# Patient Record
Sex: FEMALE | Race: WHITE | NOT HISPANIC OR LATINO | Employment: UNEMPLOYED | RURAL
[De-identification: names, ages, dates, MRNs, and addresses within clinical notes are randomized per-mention and may not be internally consistent; named-entity substitution may affect disease eponyms.]

---

## 2022-06-20 ENCOUNTER — OFFICE VISIT (OUTPATIENT)
Dept: GASTROENTEROLOGY | Facility: CLINIC | Age: 42
End: 2022-06-20
Payer: OTHER GOVERNMENT

## 2022-06-20 VITALS
HEIGHT: 64 IN | WEIGHT: 115.81 LBS | DIASTOLIC BLOOD PRESSURE: 57 MMHG | BODY MASS INDEX: 19.77 KG/M2 | HEART RATE: 68 BPM | SYSTOLIC BLOOD PRESSURE: 104 MMHG | OXYGEN SATURATION: 100 %

## 2022-06-20 DIAGNOSIS — R11.0 NAUSEA: Primary | ICD-10-CM

## 2022-06-20 DIAGNOSIS — R10.13 EPIGASTRIC PAIN: ICD-10-CM

## 2022-06-20 PROCEDURE — 99204 PR OFFICE/OUTPT VISIT, NEW, LEVL IV, 45-59 MIN: ICD-10-PCS | Mod: ,,, | Performed by: NURSE PRACTITIONER

## 2022-06-20 PROCEDURE — 99204 OFFICE O/P NEW MOD 45 MIN: CPT | Mod: ,,, | Performed by: NURSE PRACTITIONER

## 2022-06-20 RX ORDER — OMEPRAZOLE 20 MG/1
20 CAPSULE, DELAYED RELEASE ORAL DAILY
COMMUNITY
Start: 2022-04-26 | End: 2023-02-24

## 2022-06-20 NOTE — PROGRESS NOTES
Shilpa Dixon is a 41 y.o. female here for Constipation, Nausea, and Abdominal Pain        PCP: Primary Doctor No  Referring Provider: No referring provider defined for this encounter.     HPI:  Presents with report of intermittent epigastric discomfort that she feels radiates to her back. Does report bloating and fullness that has improved with small frequent meals. Reports nausea. Fatty meals make the nausea, gas, and discomfort worse. States that she does tolerate raw fruits and vegetables.Is taking Omeprazole. Did have a HIDA scan that did show borderline gallbladder dyskinesia. States that she did see Dr. Pelayo who did not feel that her symptoms were related to gallbladder dyskinesia. Endorses constipation. No hematochezia or weight loss.        ROS:  Review of Systems   Constitutional: Negative for appetite change, fatigue, fever and unexpected weight change.   HENT: Negative for trouble swallowing.    Respiratory: Negative for shortness of breath.    Gastrointestinal: Positive for abdominal distention, abdominal pain, constipation, nausea and reflux. Negative for anal bleeding, blood in stool, change in bowel habit, diarrhea, vomiting and change in bowel habit.   Genitourinary: Negative for dysuria.   Musculoskeletal: Positive for back pain. Negative for gait problem.   Integumentary:  Negative for pallor.   Psychiatric/Behavioral: The patient is not nervous/anxious.           PMHX:  has no past medical history on file.    PSHX:  has no past surgical history on file.    PFHX: family history includes Cancer in her maternal aunt, maternal grandmother, and mother; Diabetes in her maternal uncle; Hyperlipidemia in her father.    PSlHX:  reports that she has never smoked. She has never used smokeless tobacco. She reports current alcohol use. She reports that she does not use drugs.        Review of patient's allergies indicates:  No Known Allergies    Medication List with Changes/Refills   Current Medications     "OMEPRAZOLE (PRILOSEC) 20 MG CAPSULE    Take 20 mg by mouth once daily.        Objective Findings:  Vital Signs:  BP (!) 104/57   Pulse 68   Ht 5' 4" (1.626 m)   Wt 52.5 kg (115 lb 12.8 oz)   SpO2 100%   BMI 19.88 kg/m²  Body mass index is 19.88 kg/m².    Physical Exam:  Physical Exam  Vitals and nursing note reviewed.   Constitutional:       General: She is not in acute distress.     Appearance: Normal appearance.   HENT:      Mouth/Throat:      Mouth: Mucous membranes are moist.   Cardiovascular:      Rate and Rhythm: Normal rate and regular rhythm.   Pulmonary:      Breath sounds: No wheezing, rhonchi or rales.   Abdominal:      General: Abdomen is flat. Bowel sounds are normal. There is no distension.      Palpations: Abdomen is soft. There is no mass.      Tenderness: There is no abdominal tenderness. There is no guarding or rebound.      Hernia: No hernia is present.   Musculoskeletal:      Right lower leg: No edema.      Left lower leg: No edema.   Skin:     General: Skin is warm and dry.      Coloration: Skin is not jaundiced or pale.   Neurological:      Mental Status: She is alert and oriented to person, place, and time.   Psychiatric:         Mood and Affect: Mood normal.          Labs:  No results found for: WBC, HGB, HCT, MCV, RDW, PLT, GRAN, LYMPH, MONO, EOS, BASO  No results found for: NA, K, CL, CO2, GLU, BUN, CREATININE, CALCIUM, PROT, ALBUMIN, BILITOT, ALKPHOS, AST, ALT      Imaging: No results found.      Assessment:  Shilpa Dixon is a 41 y.o. female here with:  1. Nausea    2. Epigastric pain          Recommendations:  1. Labs today  2. Schedule EGD and GES (patient would like to schedule at least 3 weeks out due to vacation  3. Continue Prilosec  4. Continue small frequent meals. Avoid high fat foods    Follow up in about 6 weeks (around 8/1/2022).      Order summary:  Orders Placed This Encounter    NM Gastric Emptying    CBC Auto Differential    Comprehensive Metabolic Panel    Amylase "    Lipase    IgA    Endomysial antibody, IgA titer    Tissue Transglutaminase Ab, IgA    Gliadin (Deamidated) Ab, Eval    EGD       Thank you for allowing me to participate in the care of Shilpa Dixon.      VICTOR MANUEL Castro

## 2022-06-20 NOTE — PATIENT INSTRUCTIONS
Avoid spicy, greasy foods  Avoid caffeine, citric acid, chocolate, peppermint, and carbonated drinks  Do not lay down within 3 hours of eating  Exercise 150 minutes per week  Increase fluid to 64 ounces daily  Avoid antiinflammatory medications such as motrin, advil, aleve, ibuprofen, and BC powder    Miralax powder 1 capful in 8 ounces of liquid daily

## 2022-07-05 ENCOUNTER — TELEPHONE (OUTPATIENT)
Dept: GASTROENTEROLOGY | Facility: CLINIC | Age: 42
End: 2022-07-05
Payer: OTHER GOVERNMENT

## 2022-07-06 ENCOUNTER — TELEPHONE (OUTPATIENT)
Dept: GASTROENTEROLOGY | Facility: CLINIC | Age: 42
End: 2022-07-06
Payer: OTHER GOVERNMENT

## 2022-07-07 ENCOUNTER — TELEPHONE (OUTPATIENT)
Dept: GASTROENTEROLOGY | Facility: CLINIC | Age: 42
End: 2022-07-07
Payer: OTHER GOVERNMENT

## 2022-07-07 NOTE — TELEPHONE ENCOUNTER
Results called to patient. Verbalized understanding.    ----- Message from Dorothy Saeed sent at 7/7/2022  1:23 PM CDT -----  Returned call about lab results.

## 2022-07-22 ENCOUNTER — HOSPITAL ENCOUNTER (OUTPATIENT)
Dept: RADIOLOGY | Facility: HOSPITAL | Age: 42
Discharge: HOME | End: 2022-07-22
Attending: NURSE PRACTITIONER
Payer: OTHER GOVERNMENT

## 2022-07-22 DIAGNOSIS — R10.13 EPIGASTRIC PAIN: ICD-10-CM

## 2022-07-22 DIAGNOSIS — R11.0 NAUSEA: ICD-10-CM

## 2022-07-22 PROCEDURE — A9541 TC99M SULFUR COLLOID: HCPCS

## 2022-07-22 PROCEDURE — 78264 NM GASTRIC EMPTYING: ICD-10-PCS | Mod: 26,,, | Performed by: RADIOLOGY

## 2022-07-22 PROCEDURE — 78264 GASTRIC EMPTYING IMG STUDY: CPT | Mod: 26,,, | Performed by: RADIOLOGY

## 2022-07-29 ENCOUNTER — ANESTHESIA EVENT (OUTPATIENT)
Dept: GASTROENTEROLOGY | Facility: HOSPITAL | Age: 42
End: 2022-07-29
Payer: OTHER GOVERNMENT

## 2022-07-29 ENCOUNTER — HOSPITAL ENCOUNTER (OUTPATIENT)
Dept: GASTROENTEROLOGY | Facility: HOSPITAL | Age: 42
Discharge: HOME | End: 2022-07-29
Attending: NURSE PRACTITIONER
Payer: OTHER GOVERNMENT

## 2022-07-29 ENCOUNTER — ANESTHESIA (OUTPATIENT)
Dept: GASTROENTEROLOGY | Facility: HOSPITAL | Age: 42
End: 2022-07-29
Payer: OTHER GOVERNMENT

## 2022-07-29 VITALS — DIASTOLIC BLOOD PRESSURE: 71 MMHG | OXYGEN SATURATION: 100 % | HEART RATE: 77 BPM | SYSTOLIC BLOOD PRESSURE: 114 MMHG

## 2022-07-29 VITALS
RESPIRATION RATE: 13 BRPM | BODY MASS INDEX: 20.38 KG/M2 | WEIGHT: 115 LBS | HEART RATE: 57 BPM | SYSTOLIC BLOOD PRESSURE: 105 MMHG | DIASTOLIC BLOOD PRESSURE: 59 MMHG | HEIGHT: 63 IN | OXYGEN SATURATION: 100 %

## 2022-07-29 DIAGNOSIS — K31.84 GASTROPARESIS: ICD-10-CM

## 2022-07-29 DIAGNOSIS — K29.00 ACUTE SUPERFICIAL GASTRITIS WITHOUT HEMORRHAGE: ICD-10-CM

## 2022-07-29 DIAGNOSIS — R11.0 NAUSEA: ICD-10-CM

## 2022-07-29 DIAGNOSIS — Z01.818 PREOP TESTING: ICD-10-CM

## 2022-07-29 DIAGNOSIS — R10.13 EPIGASTRIC PAIN: ICD-10-CM

## 2022-07-29 LAB
B-HCG UR QL: NEGATIVE
CTP QC/QA: YES

## 2022-07-29 PROCEDURE — 88305 SURGICAL PATHOLOGY: ICD-10-PCS | Mod: 26,,, | Performed by: PATHOLOGY

## 2022-07-29 PROCEDURE — D9220A PRA ANESTHESIA: ICD-10-PCS | Mod: ,,, | Performed by: NURSE ANESTHETIST, CERTIFIED REGISTERED

## 2022-07-29 PROCEDURE — 81025 URINE PREGNANCY TEST: CPT | Performed by: INTERNAL MEDICINE

## 2022-07-29 PROCEDURE — 63600175 PHARM REV CODE 636 W HCPCS: Performed by: NURSE ANESTHETIST, CERTIFIED REGISTERED

## 2022-07-29 PROCEDURE — 43239 EGD BIOPSY SINGLE/MULTIPLE: CPT

## 2022-07-29 PROCEDURE — 27000716 HC OXISENSOR PROBE, ANY SIZE: Performed by: NURSE ANESTHETIST, CERTIFIED REGISTERED

## 2022-07-29 PROCEDURE — 88342 SURGICAL PATHOLOGY: ICD-10-PCS | Mod: 26,,, | Performed by: PATHOLOGY

## 2022-07-29 PROCEDURE — 88305 TISSUE EXAM BY PATHOLOGIST: CPT | Mod: 26,,, | Performed by: PATHOLOGY

## 2022-07-29 PROCEDURE — 88305 TISSUE EXAM BY PATHOLOGIST: CPT | Mod: SUR | Performed by: INTERNAL MEDICINE

## 2022-07-29 PROCEDURE — 88342 IMHCHEM/IMCYTCHM 1ST ANTB: CPT | Mod: 26,,, | Performed by: PATHOLOGY

## 2022-07-29 PROCEDURE — 25000003 PHARM REV CODE 250: Performed by: NURSE ANESTHETIST, CERTIFIED REGISTERED

## 2022-07-29 PROCEDURE — 27000284 HC CANNULA NASAL: Performed by: NURSE ANESTHETIST, CERTIFIED REGISTERED

## 2022-07-29 PROCEDURE — D9220A PRA ANESTHESIA: Mod: ,,, | Performed by: NURSE ANESTHETIST, CERTIFIED REGISTERED

## 2022-07-29 RX ORDER — SODIUM CHLORIDE 0.9 % (FLUSH) 0.9 %
10 SYRINGE (ML) INJECTION
Status: DISCONTINUED | OUTPATIENT
Start: 2022-07-29 | End: 2022-07-30 | Stop reason: HOSPADM

## 2022-07-29 RX ORDER — PROPOFOL 10 MG/ML
VIAL (ML) INTRAVENOUS
Status: DISCONTINUED | OUTPATIENT
Start: 2022-07-29 | End: 2022-07-29

## 2022-07-29 RX ORDER — LIDOCAINE HYDROCHLORIDE 20 MG/ML
INJECTION, SOLUTION EPIDURAL; INFILTRATION; INTRACAUDAL; PERINEURAL
Status: DISCONTINUED | OUTPATIENT
Start: 2022-07-29 | End: 2022-07-29

## 2022-07-29 RX ADMIN — PROPOFOL 40 MG: 10 INJECTION, EMULSION INTRAVENOUS at 12:07

## 2022-07-29 RX ADMIN — PROPOFOL 80 MG: 10 INJECTION, EMULSION INTRAVENOUS at 12:07

## 2022-07-29 RX ADMIN — LIDOCAINE HYDROCHLORIDE 60 MG: 20 INJECTION, SOLUTION INTRAVENOUS at 12:07

## 2022-07-29 RX ADMIN — SODIUM CHLORIDE: 9 INJECTION, SOLUTION INTRAVENOUS at 12:07

## 2022-07-29 NOTE — TRANSFER OF CARE
"Anesthesia Transfer of Care Note    Patient: Shilpa Dixon    Procedure(s) Performed: * No procedures listed *    Patient location: GI    Anesthesia Type: general    Transport from OR: Transported from OR on room air with adequate spontaneous ventilation. Continuous ECG monitoring in transport. Continuous SpO2 monitoring in transport    Post pain: adequate analgesia    Post assessment: no apparent anesthetic complications    Post vital signs: stable    Level of consciousness: sedated and responds to stimulation    Nausea/Vomiting: no nausea/vomiting    Complications: none    Transfer of care protocol was followedComments: Good SV continue, NAD, VSS, RTRN      Last vitals:   Visit Vitals  /71 (BP Location: Left arm, Patient Position: Lying)   Pulse 65   Resp 13   Ht 5' 3" (1.6 m)   Wt 52.2 kg (115 lb)   SpO2 100%   Breastfeeding No   BMI 20.37 kg/m²     "

## 2022-07-29 NOTE — DISCHARGE INSTRUCTIONS
Procedure Date  7/29/22     Impression  Overall Impression: Mucosa of the esophagus is normal with z-line at 36cm. The stomach had mild erythema without ulcers and the pyloric channel was patent. Gastric biopsies were obtained. Mucosa of the duodenum was normal.     Recommendation  Await pathology results  Eat small frequent low residue meals. Return to GI clinic for follow-up of gastroparesis.    THE NURSE WILL CALL YOU WITH YOUR BIOPSY RESULTS IN A FEW DAYS.     NO DRIVING, OPERATING EQUIPMENT, OR SIGNING LEGAL DOCUMENTS FOR 24 HOURS.

## 2022-07-29 NOTE — H&P
[facility]  Gastroenterology  H&P    Patient Name: Shilpa Dixon  MRN: [id number]  Admission Date: 7/29/2022  Code Status: Full Code    Attending Provider: Akbar Murphy MD  Primary Care Physician: Larry Pollock MD  Principal Problem:<principal problem not specified>    Subjective:     History of Present Illness: Pt has abdominal pain and gastroparesis; for egd.    History reviewed. No pertinent past medical history.    History reviewed. No pertinent surgical history.    Review of patient's allergies indicates:  No Known Allergies  Family History     Problem Relation (Age of Onset)    Cancer Mother, Maternal Aunt, Maternal Grandmother    Diabetes Maternal Uncle    Hyperlipidemia Father        Tobacco Use    Smoking status: Never Smoker    Smokeless tobacco: Never Used   Substance and Sexual Activity    Alcohol use: Yes     Comment: occasionally    Drug use: Never    Sexual activity: Yes     Partners: Male     Review of Systems   Respiratory: Negative.    Cardiovascular: Negative.    Gastrointestinal: Negative.      Objective:     Vital Signs (Most Recent):  Pulse: 75 (07/29/22 1200)  Resp: 13 (07/29/22 1200)  BP: 109/69 (07/29/22 1200)  SpO2: 100 % (07/29/22 1200) Vital Signs (24h Range):  Pulse:  [75] 75  Resp:  [13] 13  SpO2:  [100 %] 100 %  BP: (109)/(69) 109/69     Weight: 52.2 kg (115 lb) (07/29/22 1200)  Body mass index is 20.37 kg/m².    No intake or output data in the 24 hours ending 07/29/22 1242    Lines/Drains/Airways     None                 Physical Exam  Vitals reviewed.   Constitutional:       General: She is not in acute distress.     Appearance: Normal appearance. She is well-developed. She is not ill-appearing.   HENT:      Head: Normocephalic and atraumatic.      Nose: Nose normal.   Eyes:      Pupils: Pupils are equal, round, and reactive to light.   Cardiovascular:      Rate and Rhythm: Normal rate and regular rhythm.   Pulmonary:      Effort: Pulmonary effort is normal.      Breath  sounds: Normal breath sounds. No wheezing.   Abdominal:      General: Abdomen is flat. Bowel sounds are normal. There is no distension.      Palpations: Abdomen is soft.      Tenderness: There is no abdominal tenderness. There is no guarding.   Skin:     General: Skin is warm and dry.      Coloration: Skin is not jaundiced.   Neurological:      Mental Status: She is alert.   Psychiatric:         Attention and Perception: Attention normal.         Mood and Affect: Affect normal.         Speech: Speech normal.         Behavior: Behavior is cooperative.      Comments: Pt was calm while speaking.         Significant Labs:  CBC: No results for input(s): WBC, HGB, HCT, PLT in the last 48 hours.  CMP: No results for input(s): GLU, CALCIUM, ALBUMIN, PROT, NA, K, CO2, CL, BUN, CREATININE, ALKPHOS, ALT, AST, BILITOT in the last 48 hours.    Significant Imaging:  Imaging results within the past 24 hours have been reviewed.    Assessment/Plan:     There are no hospital problems to display for this patient.        Imp: abdominal pain; gastroparesis  Plan: egd    Akbar Murphy MD  Gastroenterology  [facility]

## 2022-07-29 NOTE — ANESTHESIA PREPROCEDURE EVALUATION
07/29/2022  Shilpa Dixon is a 41 y.o., female.  History reviewed. No pertinent past medical history.    History reviewed. No pertinent surgical history.    Family History   Problem Relation Age of Onset    Cancer Mother     Hyperlipidemia Father     Cancer Maternal Aunt     Diabetes Maternal Uncle     Cancer Maternal Grandmother        Social History     Socioeconomic History    Marital status:    Tobacco Use    Smoking status: Never Smoker    Smokeless tobacco: Never Used   Substance and Sexual Activity    Alcohol use: Yes     Comment: occasionally    Drug use: Never    Sexual activity: Yes     Partners: Male       Current Outpatient Medications   Medication Sig Dispense Refill    omeprazole (PRILOSEC) 20 MG capsule Take 20 mg by mouth once daily.       No current facility-administered medications for this encounter.       Review of patient's allergies indicates:  No Known Allergies    Pre-op Assessment    I have reviewed the Patient Summary Reports.     I have reviewed the Nursing Notes. I have reviewed the NPO Status.   I have reviewed the Medications.     Review of Systems  Anesthesia Hx:  No problems with previous Anesthesia  Denies Family Hx of Anesthesia complications.   Denies Personal Hx of Anesthesia complications.   Hematology/Oncology:     Oncology Normal     EENT/Dental:EENT/Dental Normal   Cardiovascular:   hyperlipidemia    Renal/:  Renal/ Normal     Musculoskeletal:  Musculoskeletal Normal    Neurological:  Neurology Normal    Endocrine:   Denies Diabetes.    Dermatological:  Skin Normal    Psych:  Psychiatric Normal           Physical Exam  General: Well nourished, Alert, Oriented and Cooperative    Airway:  Mouth Opening: Normal  Neck ROM: Normal ROM    Chest/Lungs:  Normal Respiratory Rate    Heart:  Rate: Normal        Anesthesia Plan  Type of Anesthesia, risks &  benefits discussed:    Anesthesia Type: Gen Natural Airway, MAC  Intra-op Monitoring Plan: Standard ASA Monitors  Post Op Pain Control Plan: multimodal analgesia and IV/PO Opioids PRN  Induction:  IV  Informed Consent: Informed consent signed with the Patient and all parties understand the risks and agree with anesthesia plan.  All questions answered. Patient consented to blood products? Yes  ASA Score: 2  Day of Surgery Review of History & Physical: I have interviewed and examined the patient. I have reviewed the patient's H&P dated: There are no significant changes.     Ready For Surgery From Anesthesia Perspective.     .

## 2022-07-29 NOTE — ANESTHESIA POSTPROCEDURE EVALUATION
Anesthesia Post Evaluation    Patient: Shilpa Dixon    Procedure(s) Performed: * No procedures listed *    Final Anesthesia Type: general      Patient location during evaluation: GI PACU  Patient participation: Yes- Able to Participate  Level of consciousness: awake and alert  Post-procedure vital signs: reviewed and stable  Pain management: adequate  Airway patency: patent    PONV status at discharge: No PONV  Anesthetic complications: no      Cardiovascular status: blood pressure returned to baseline and hemodynamically stable  Respiratory status: spontaneous ventilation  Hydration status: euvolemic  Follow-up not needed.  Comments: Pt voices appreciation for care          Vitals Value Taken Time   /59 07/29/22 1320   Temp 97 F 07/29/22 1416   Pulse 59 07/29/22 1321   Resp 11 07/29/22 1321   SpO2 100 % 07/29/22 1321   Vitals shown include unvalidated device data.      Event Time   Out of Recovery 13:23:16         Pain/Cassie Score: Cassie Score: 10 (7/29/2022 12:57 PM)

## 2022-08-01 ENCOUNTER — OFFICE VISIT (OUTPATIENT)
Dept: GASTROENTEROLOGY | Facility: CLINIC | Age: 42
End: 2022-08-01
Payer: OTHER GOVERNMENT

## 2022-08-01 VITALS
BODY MASS INDEX: 20.7 KG/M2 | HEIGHT: 63 IN | WEIGHT: 116.81 LBS | OXYGEN SATURATION: 100 % | SYSTOLIC BLOOD PRESSURE: 101 MMHG | HEART RATE: 64 BPM | DIASTOLIC BLOOD PRESSURE: 49 MMHG

## 2022-08-01 DIAGNOSIS — R10.13 EPIGASTRIC PAIN: ICD-10-CM

## 2022-08-01 DIAGNOSIS — K31.84 GASTROPARESIS: Primary | ICD-10-CM

## 2022-08-01 LAB
ESTROGEN SERPL-MCNC: NORMAL PG/ML
INSULIN SERPL-ACNC: NORMAL U[IU]/ML
LAB AP GROSS DESCRIPTION: NORMAL
LAB AP LABORATORY NOTES: NORMAL
T3RU NFR SERPL: NORMAL %

## 2022-08-01 PROCEDURE — 99214 PR OFFICE/OUTPT VISIT, EST, LEVL IV, 30-39 MIN: ICD-10-PCS | Mod: ,,, | Performed by: NURSE PRACTITIONER

## 2022-08-01 PROCEDURE — 99214 OFFICE O/P EST MOD 30 MIN: CPT | Mod: ,,, | Performed by: NURSE PRACTITIONER

## 2022-08-01 RX ORDER — POLYETHYLENE GLYCOL 3350 17 G/17G
17 POWDER, FOR SOLUTION ORAL DAILY
COMMUNITY

## 2022-08-01 NOTE — PROGRESS NOTES
Shilpa Dixon is a 41 y.o. female here for Follow-up        PCP: Larry Pollock  Referring Provider: No referring provider defined for this encounter.     HPI:  Presents for follow up after EGD and GES. GES is delayed. Delayed gastric emptying with calculated half time of 532 minutes. She does have abdominal bloating and epigastric discomfort. Does not specifically describe early satiety. States that she has been reading about diet changes associated with gastroparesis. No vomiting. Discussed low residue,small frequent meals diet. EGD reviewed, pylori channel is patent. Hida scan did show borderline dyskinesia. She has been evaluated by the surgeon. Reports constipation. Has been taking miralax powder for about 4 days. This has improved bowel movements. Does also report decreased sensation in the rectal area at times. Denies any low back pain or known back problems. Has not seen Gyn in several years. Has had two vaginal births. No recent hematochezia.        ROS:  Review of Systems   Constitutional: Negative for appetite change, fatigue, fever and unexpected weight change.   HENT: Negative for trouble swallowing.    Respiratory: Negative for shortness of breath.    Cardiovascular: Negative for chest pain.   Gastrointestinal: Positive for abdominal distention (bloating), abdominal pain, change in bowel habit, constipation and change in bowel habit. Negative for blood in stool, diarrhea, nausea, vomiting and reflux.   Musculoskeletal: Positive for back pain (upper back intermittent. No low back pain). Negative for gait problem.   Integumentary:  Negative for pallor.   Neurological: Negative for dizziness and light-headedness.   Psychiatric/Behavioral: The patient is not nervous/anxious.           PMHX:  has a past medical history of Acute superficial gastritis without hemorrhage (7/29/2022) and Gastroparesis (7/29/2022).    PSHX:  has no past surgical history on file.    PFHX: family history includes Cancer in her  "maternal aunt, maternal grandmother, and mother; Diabetes in her maternal uncle; Hyperlipidemia in her father.    PSlHX:  reports that she has never smoked. She has never used smokeless tobacco. She reports current alcohol use. She reports that she does not use drugs.        Review of patient's allergies indicates:  No Known Allergies    Medication List with Changes/Refills   Current Medications    OMEPRAZOLE (PRILOSEC) 20 MG CAPSULE    Take 20 mg by mouth once daily.    POLYETHYLENE GLYCOL (GLYCOLAX) 17 GRAM PWPK    Take 17 g by mouth once daily.        Objective Findings:  Vital Signs:  BP (!) 101/49   Pulse 64   Ht 5' 3" (1.6 m)   Wt 53 kg (116 lb 12.8 oz)   SpO2 100%   BMI 20.69 kg/m²  Body mass index is 20.69 kg/m².    Physical Exam:  Physical Exam  Vitals and nursing note reviewed.   Constitutional:       General: She is not in acute distress.     Appearance: Normal appearance. She is not ill-appearing.   Cardiovascular:      Rate and Rhythm: Normal rate.   Pulmonary:      Breath sounds: No wheezing, rhonchi or rales.   Abdominal:      General: Bowel sounds are normal. There is no distension.      Palpations: Abdomen is soft. There is no mass.      Tenderness: There is no guarding or rebound.   Musculoskeletal:      Right lower leg: No edema.      Left lower leg: No edema.   Skin:     General: Skin is warm and dry.      Coloration: Skin is not jaundiced or pale.   Neurological:      Mental Status: She is alert and oriented to person, place, and time.   Psychiatric:         Mood and Affect: Mood normal.          Labs:  Lab Results   Component Value Date    WBC 6.65 06/20/2022    HGB 12.9 06/20/2022    HCT 37.4 (L) 06/20/2022    MCV 93.5 06/20/2022    RDW 12.7 06/20/2022     06/20/2022    LYMPH 21.8 (L) 06/20/2022    LYMPH 1.45 06/20/2022    MONO 6.0 06/20/2022    EOS 0.06 06/20/2022    BASO 0.03 06/20/2022     Lab Results   Component Value Date     06/20/2022    K 4.0 06/20/2022     " 06/20/2022    CO2 28 06/20/2022    GLU 90 06/20/2022    BUN 14 06/20/2022    CREATININE 0.78 06/20/2022    CALCIUM 9.5 06/20/2022    PROT 7.4 06/20/2022    ALBUMIN 4.3 06/20/2022    BILITOT 0.5 06/20/2022    ALKPHOS 51 06/20/2022    AST 13 (L) 06/20/2022    ALT 22 06/20/2022         Imaging: NM Gastric Emptying    Result Date: 7/22/2022  EXAMINATION: Nuclear medicine GASTRIC EMPTYING CLINICAL HISTORY: .  Nausea COMPARISON: No previous similar TECHNIQUE: Following the ingestion of 500 µCi technetium 99m sulfur colloid mixed with grits, gastric emptying study was performed using 90 minute protocol. Images were obtained over the abdomen and pelvis to document progression of radiotracer into small bowel. Gastric emptying curve was also generated. FINDINGS: The stomach is 3 % empty at 45 minutes and 14 % empty at 90 minutes. The half-time to empty measures 532 minutes using linear fit analysis. Images document migration of radiotracer from stomach into small bowel     Delayed gastric emptying with calculated half time of 532 minutes Electronically signed by: Larry Jules Date:    07/22/2022 Time:    12:50    EGD    Result Date: 7/29/2022  Procedure Date 7/29/22 Impression Overall      Mucosa of the esophagus is normal with z-line at 36cm. The stomach had mild erythema without ulcers and the pyloric channel was patent. Gastric biopsies were obtained. Mucosa of the duodenum was normal. Recommendation Await pathology results Eat small frequent low residue meals. Return to GI clinic for follow-up of gastroparesis. Indication Nausea, Epigastric pain Providers Attending: Akbar Murphy MD Fellow:  Other Staff: Tavo Connor RN, Kelli Ron, Jamie Muniz CRNA Medications Moderate sedation administered by anesthesia staff - See anesthesia record. Preprocedure A history and physical has been performed, and patient medication allergies have been reviewed. The patient's tolerance of previous anesthesia has been  reviewed. The risks and benefits of the procedure and the sedation options and risks were discussed with the patient. All questions were answered and informed consent obtained. ASA Score: ASA 2 - Patient with mild systemic disease with no functional limitations Mallampati Airway Score: I (soft palate, uvula, fauces, and tonsillar pillars visible) Details of the Procedure The patient underwent monitored anesthesia care, which was administered by an anesthesia professional. The patient's blood pressure, heart rate, level of consciousness, oxygen, respirations, ECG and ETCO2 were monitored throughout the procedure. The scope was introduced through the mouth and advanced to the third part of the duodenum. Retroflexion was performed in the cardia. Prior to the procedure, the patient's H. Pylori status was unknown. The patient's estimated blood loss was minimal (<5 mL). The procedure was not difficult. The patient tolerated the procedure well. There were no apparent complications. Scope: Gastroscope Scope Serial: [id number] Events Procedure Events Event Event Time Procedure Events Event Event Time SCOPE IN 7/29/2022 12:44 PM SCOPE OUT 7/29/2022 12:47 PM Findings Erythematous mucosa in the fundus of the stomach and antrum; performed cold forceps biopsy         Assessment:  Shilpa Dixon is a 41 y.o. female here with:  1. Gastroparesis    2. Epigastric pain          Recommendations:  1. Low residue, small frequent meals diet. Gastroparesis diet given to patient  2. Small amounts of protein and fats at one time  3. Avoid Boost Protein drinks  4. Do not lay down within 3 hours of eating.  Avoid spicy, greasy foods  Exercise 150 minutes per week  Avoid raw fruits and vegetables  Consider referral to dietician if needed  5. Continue Miralax for constipation  6. See primary care for back evaluation. Should also follow up with GYN      Follow up in about 3 months (around 11/1/2022).      Order summary:       Thank you for allowing me to  participate in the care of Shilpa Dixon.      VICTOR MANUEL Castro

## 2022-08-01 NOTE — PATIENT INSTRUCTIONS
Do not lay down within 3 hours of eating.  Avoid spicy, greasy foods  Eat 6-8 small meals per day.  Exercise 150 minutes per week  Avoid raw fruits and vegetables   See gastroparesis sheet   Miralax 1 capful in 8 ounces of liquid daily

## 2022-08-08 ENCOUNTER — TELEPHONE (OUTPATIENT)
Dept: GASTROENTEROLOGY | Facility: CLINIC | Age: 42
End: 2022-08-08
Payer: OTHER GOVERNMENT

## 2022-08-08 NOTE — TELEPHONE ENCOUNTER
Called patient to discuss results and verbalized understanding.      ----- Message from Akbar Murphy MD sent at 8/7/2022  9:05 PM CDT -----  Send a copy of report to  at [facility]; biopsies of stomach were normal.

## 2022-11-07 ENCOUNTER — OFFICE VISIT (OUTPATIENT)
Dept: GASTROENTEROLOGY | Facility: CLINIC | Age: 42
End: 2022-11-07
Payer: OTHER GOVERNMENT

## 2022-11-07 VITALS
SYSTOLIC BLOOD PRESSURE: 95 MMHG | DIASTOLIC BLOOD PRESSURE: 62 MMHG | OXYGEN SATURATION: 100 % | HEART RATE: 73 BPM | BODY MASS INDEX: 20.94 KG/M2 | HEIGHT: 63 IN | WEIGHT: 118.19 LBS

## 2022-11-07 DIAGNOSIS — K31.84 GASTROPARESIS: Primary | ICD-10-CM

## 2022-11-07 DIAGNOSIS — R53.83 FATIGUE, UNSPECIFIED TYPE: ICD-10-CM

## 2022-11-07 PROCEDURE — 99213 PR OFFICE/OUTPT VISIT, EST, LEVL III, 20-29 MIN: ICD-10-PCS | Mod: ,,, | Performed by: NURSE PRACTITIONER

## 2022-11-07 PROCEDURE — 99213 OFFICE O/P EST LOW 20 MIN: CPT | Mod: ,,, | Performed by: NURSE PRACTITIONER

## 2022-11-07 RX ORDER — FERROUS SULFATE 324(65)MG
324 TABLET, DELAYED RELEASE (ENTERIC COATED) ORAL DAILY
Qty: 90 TABLET | Refills: 3 | Status: SHIPPED | OUTPATIENT
Start: 2022-11-07

## 2022-11-07 NOTE — PROGRESS NOTES
Pt is a 40 y/o WF here for 3 month follow up of gastroparesis. Nausea/epigastric pain feeling better. Eating a little more, avoiding certain triggers like raw vegetables. Does still have early satiety and feeling of fullness but states symptoms are overall much better and tolerable.  Past Medical History:   Diagnosis Date    Acute superficial gastritis without hemorrhage 7/29/2022    Gastroparesis 7/29/2022     Review of Systems   Constitutional:  Positive for malaise/fatigue. Negative for chills and fever.   Respiratory:  Negative for shortness of breath.    Cardiovascular:  Negative for chest pain.   Gastrointestinal:  Negative for abdominal pain, blood in stool, constipation, diarrhea, melena, nausea and vomiting.   Skin:  Negative for rash.   Neurological:  Negative for weakness.     Physical Exam  Vitals reviewed. Exam conducted with a chaperone present.   Constitutional:       General: She is not in acute distress.     Appearance: Normal appearance.   HENT:      Head: Normocephalic.   Eyes:      General: No scleral icterus.     Pupils: Pupils are equal, round, and reactive to light.   Cardiovascular:      Rate and Rhythm: Normal rate.   Pulmonary:      Effort: Pulmonary effort is normal.   Abdominal:      General: There is no distension.      Palpations: Abdomen is soft.      Tenderness: There is no abdominal tenderness. There is no guarding or rebound.   Skin:     General: Skin is warm and dry.   Neurological:      General: No focal deficit present.      Mental Status: She is alert and oriented to person, place, and time. Mental status is at baseline.   Psychiatric:         Mood and Affect: Mood normal.         Behavior: Behavior normal.         Thought Content: Thought content normal.         Judgment: Judgment normal.     Plan  -Findings show delayed gastric emptying. Recommendations include smaller, more frequent, low fat, low residue meals. Eat 6 small versus 3 large meals a day. Blenderize meals if no  better.  -gastroparesis diet handout given to pt  -CBC, CMP, TSH, T4, iron/ferritin, B12 for pt c/o fatigue  -RTC 6 months, sooner PRN

## 2023-01-10 ENCOUNTER — OFFICE VISIT (OUTPATIENT)
Dept: OBSTETRICS AND GYNECOLOGY | Facility: CLINIC | Age: 43
End: 2023-01-10
Payer: OTHER GOVERNMENT

## 2023-01-10 VITALS
WEIGHT: 114.19 LBS | HEART RATE: 73 BPM | DIASTOLIC BLOOD PRESSURE: 63 MMHG | SYSTOLIC BLOOD PRESSURE: 111 MMHG | BODY MASS INDEX: 20.23 KG/M2

## 2023-01-10 DIAGNOSIS — Z72.51 HIGH RISK SEXUAL BEHAVIOR, UNSPECIFIED TYPE: ICD-10-CM

## 2023-01-10 DIAGNOSIS — Z01.419 ROUTINE GYNECOLOGICAL EXAMINATION: ICD-10-CM

## 2023-01-10 DIAGNOSIS — Z11.3 SCREEN FOR STD (SEXUALLY TRANSMITTED DISEASE): ICD-10-CM

## 2023-01-10 DIAGNOSIS — N92.0 MENORRHAGIA WITH REGULAR CYCLE: ICD-10-CM

## 2023-01-10 DIAGNOSIS — Z11.4 SCREENING FOR HIV (HUMAN IMMUNODEFICIENCY VIRUS): ICD-10-CM

## 2023-01-10 DIAGNOSIS — Z12.4 CERVICAL CANCER SCREENING: Primary | ICD-10-CM

## 2023-01-10 LAB
BASOPHILS # BLD AUTO: 0.03 K/UL (ref 0–0.2)
BASOPHILS NFR BLD AUTO: 0.4 % (ref 0–1)
CANDIDA SPECIES: NEGATIVE
DIFFERENTIAL METHOD BLD: ABNORMAL
EOSINOPHIL # BLD AUTO: 0.03 K/UL (ref 0–0.5)
EOSINOPHIL NFR BLD AUTO: 0.4 % (ref 1–4)
ERYTHROCYTE [DISTWIDTH] IN BLOOD BY AUTOMATED COUNT: 12.3 % (ref 11.5–14.5)
FSH SERPL-ACNC: 3.9 MIU/ML (ref 1.7–134.8)
GARDNERELLA: POSITIVE
HAV IGM SER QL: NORMAL
HBV CORE IGM SER QL: NORMAL
HBV SURFACE AG SERPL QL IA: NORMAL
HCT VFR BLD AUTO: 43.8 % (ref 38–47)
HCV AB SER QL: NORMAL
HGB BLD-MCNC: 14.9 G/DL (ref 12–16)
HIV 1+O+2 AB SERPL QL: NORMAL
IMM GRANULOCYTES # BLD AUTO: 0.02 K/UL (ref 0–0.04)
IMM GRANULOCYTES NFR BLD: 0.3 % (ref 0–0.4)
LH SERPL-ACNC: 4.6 MIU/ML
LYMPHOCYTES # BLD AUTO: 1.53 K/UL (ref 1–4.8)
LYMPHOCYTES NFR BLD AUTO: 21.2 % (ref 27–41)
MCH RBC QN AUTO: 32 PG (ref 27–31)
MCHC RBC AUTO-ENTMCNC: 34 G/DL (ref 32–36)
MCV RBC AUTO: 94 FL (ref 80–96)
MONOCYTES # BLD AUTO: 0.44 K/UL (ref 0–0.8)
MONOCYTES NFR BLD AUTO: 6.1 % (ref 2–6)
MPC BLD CALC-MCNC: 12.1 FL (ref 9.4–12.4)
NEUTROPHILS # BLD AUTO: 5.18 K/UL (ref 1.8–7.7)
NEUTROPHILS NFR BLD AUTO: 71.6 % (ref 53–65)
NRBC # BLD AUTO: 0 X10E3/UL
NRBC, AUTO (.00): 0 %
PLATELET # BLD AUTO: 235 K/UL (ref 150–400)
RBC # BLD AUTO: 4.66 M/UL (ref 4.2–5.4)
SYPHILIS AB INTERPRETATION: NORMAL
T4 FREE SERPL-MCNC: 0.99 NG/DL (ref 0.76–1.46)
TESTOST SERPL-MCNC: 21 NG/DL (ref 8–60)
TRICHOMONAS: NEGATIVE
TSH SERPL DL<=0.005 MIU/L-ACNC: 2.64 UIU/ML (ref 0.36–3.74)
WBC # BLD AUTO: 7.23 K/UL (ref 4.5–11)

## 2023-01-10 PROCEDURE — 86780 TREPONEMA PALLIDUM (SYPHILIS) ANTIBODY: ICD-10-PCS | Mod: ,,, | Performed by: CLINICAL MEDICAL LABORATORY

## 2023-01-10 PROCEDURE — 80074 ACUTE HEPATITIS PANEL: CPT | Mod: ,,, | Performed by: CLINICAL MEDICAL LABORATORY

## 2023-01-10 PROCEDURE — 99396 PREV VISIT EST AGE 40-64: CPT | Mod: ,,, | Performed by: OBSTETRICS & GYNECOLOGY

## 2023-01-10 PROCEDURE — 87660 TRICHOMONAS VAGIN DIR PROBE: CPT | Mod: ,,, | Performed by: CLINICAL MEDICAL LABORATORY

## 2023-01-10 PROCEDURE — 85025 CBC WITH DIFFERENTIAL: ICD-10-PCS | Mod: ,,, | Performed by: CLINICAL MEDICAL LABORATORY

## 2023-01-10 PROCEDURE — 87389 HIV 1 / 2 ANTIBODY: ICD-10-PCS | Mod: ,,, | Performed by: CLINICAL MEDICAL LABORATORY

## 2023-01-10 PROCEDURE — 36415 COLL VENOUS BLD VENIPUNCTURE: CPT | Mod: ,,, | Performed by: OBSTETRICS & GYNECOLOGY

## 2023-01-10 PROCEDURE — 80074 HEPATITIS PANEL, ACUTE: ICD-10-PCS | Mod: ,,, | Performed by: CLINICAL MEDICAL LABORATORY

## 2023-01-10 PROCEDURE — 87591 CHLAMYDIA/GONORRHOEAE(GC), PCR: ICD-10-PCS | Mod: ,,, | Performed by: CLINICAL MEDICAL LABORATORY

## 2023-01-10 PROCEDURE — 83002 ASSAY OF GONADOTROPIN (LH): CPT | Mod: ,,, | Performed by: CLINICAL MEDICAL LABORATORY

## 2023-01-10 PROCEDURE — 87491 CHLMYD TRACH DNA AMP PROBE: CPT | Mod: ,,, | Performed by: CLINICAL MEDICAL LABORATORY

## 2023-01-10 PROCEDURE — 84439 ASSAY OF FREE THYROXINE: CPT | Mod: ,,, | Performed by: CLINICAL MEDICAL LABORATORY

## 2023-01-10 PROCEDURE — 87660 BACTERIAL VAGINOSIS: ICD-10-PCS | Mod: ,,, | Performed by: CLINICAL MEDICAL LABORATORY

## 2023-01-10 PROCEDURE — 87624 HUMAN PAPILLOMAVIRUS (HPV): ICD-10-PCS | Mod: ,,, | Performed by: CLINICAL MEDICAL LABORATORY

## 2023-01-10 PROCEDURE — 87510 GARDNER VAG DNA DIR PROBE: CPT | Mod: ,,, | Performed by: CLINICAL MEDICAL LABORATORY

## 2023-01-10 PROCEDURE — 83001 ASSAY OF GONADOTROPIN (FSH): CPT | Mod: ,,, | Performed by: CLINICAL MEDICAL LABORATORY

## 2023-01-10 PROCEDURE — 36415 PR COLLECTION VENOUS BLOOD,VENIPUNCTURE: ICD-10-PCS | Mod: ,,, | Performed by: OBSTETRICS & GYNECOLOGY

## 2023-01-10 PROCEDURE — 83001 FOLLICLE STIMULATING HORMONE: ICD-10-PCS | Mod: ,,, | Performed by: CLINICAL MEDICAL LABORATORY

## 2023-01-10 PROCEDURE — 84439 T4, FREE: ICD-10-PCS | Mod: ,,, | Performed by: CLINICAL MEDICAL LABORATORY

## 2023-01-10 PROCEDURE — 87480 CANDIDA DNA DIR PROBE: CPT | Mod: ,,, | Performed by: CLINICAL MEDICAL LABORATORY

## 2023-01-10 PROCEDURE — 85025 COMPLETE CBC W/AUTO DIFF WBC: CPT | Mod: ,,, | Performed by: CLINICAL MEDICAL LABORATORY

## 2023-01-10 PROCEDURE — 87491 CHLAMYDIA/GONORRHOEAE(GC), PCR: ICD-10-PCS | Mod: ,,, | Performed by: CLINICAL MEDICAL LABORATORY

## 2023-01-10 PROCEDURE — 88142 CYTOPATH C/V THIN LAYER: CPT | Mod: TC,GCY | Performed by: OBSTETRICS & GYNECOLOGY

## 2023-01-10 PROCEDURE — 83002 LUTEINIZING HORMONE: ICD-10-PCS | Mod: ,,, | Performed by: CLINICAL MEDICAL LABORATORY

## 2023-01-10 PROCEDURE — 84443 TSH: ICD-10-PCS | Mod: ,,, | Performed by: CLINICAL MEDICAL LABORATORY

## 2023-01-10 PROCEDURE — 87389 HIV-1 AG W/HIV-1&-2 AB AG IA: CPT | Mod: ,,, | Performed by: CLINICAL MEDICAL LABORATORY

## 2023-01-10 PROCEDURE — 84403 TESTOSTERONE: ICD-10-PCS | Mod: ,,, | Performed by: CLINICAL MEDICAL LABORATORY

## 2023-01-10 PROCEDURE — 86780 TREPONEMA PALLIDUM: CPT | Mod: ,,, | Performed by: CLINICAL MEDICAL LABORATORY

## 2023-01-10 PROCEDURE — 87510 BACTERIAL VAGINOSIS: ICD-10-PCS | Mod: ,,, | Performed by: CLINICAL MEDICAL LABORATORY

## 2023-01-10 PROCEDURE — 84443 ASSAY THYROID STIM HORMONE: CPT | Mod: ,,, | Performed by: CLINICAL MEDICAL LABORATORY

## 2023-01-10 PROCEDURE — 84403 ASSAY OF TOTAL TESTOSTERONE: CPT | Mod: ,,, | Performed by: CLINICAL MEDICAL LABORATORY

## 2023-01-10 PROCEDURE — 99396 PR PREVENTIVE VISIT,EST,40-64: ICD-10-PCS | Mod: ,,, | Performed by: OBSTETRICS & GYNECOLOGY

## 2023-01-10 PROCEDURE — 87624 HPV HI-RISK TYP POOLED RSLT: CPT | Mod: ,,, | Performed by: CLINICAL MEDICAL LABORATORY

## 2023-01-10 PROCEDURE — 87480 BACTERIAL VAGINOSIS: ICD-10-PCS | Mod: ,,, | Performed by: CLINICAL MEDICAL LABORATORY

## 2023-01-10 PROCEDURE — 87591 N.GONORRHOEAE DNA AMP PROB: CPT | Mod: ,,, | Performed by: CLINICAL MEDICAL LABORATORY

## 2023-01-10 NOTE — PROGRESS NOTES
CC: Well woman exam    Shilpa Dixon is a 42 y.o. female  presents for well woman exam.    LMP: Patient's last menstrual period was 2022 (approximate)..    Last mammogram: 2022 at [facility]  Last Colonoscopy: N/A    Pt states her cycles are 4-5 days that are heavy with half dollar sized clots every day of her cycle. .     Past Medical History:   Diagnosis Date    Acute superficial gastritis without hemorrhage 2022    Gastroparesis 2022     History reviewed. No pertinent surgical history.  Social History     Socioeconomic History    Marital status:    Tobacco Use    Smoking status: Never     Passive exposure: Never    Smokeless tobacco: Never   Substance and Sexual Activity    Alcohol use: Yes     Comment: occasionally    Drug use: Never    Sexual activity: Yes     Partners: Male     Family History   Problem Relation Age of Onset    Cancer Mother     Hyperlipidemia Father     Cancer Maternal Aunt     Diabetes Maternal Uncle     Cancer Maternal Grandmother      OB History          2    Para        Term                AB        Living   2         SAB        IAB        Ectopic        Multiple        Live Births                     /63   Pulse 73   Wt 51.8 kg (114 lb 3.2 oz)   LMP 2022 (Approximate)   BMI 20.23 kg/m²       ROS:  GENERAL: Denies weight gain or weight loss. Feeling well overall.   SKIN: Denies rash or lesions.   HEAD: Denies head injury or headache.   NODES: Denies enlarged lymph nodes.   CHEST: Denies chest pain or shortness of breath.   CARDIOVASCULAR: Denies palpitations or left sided chest pain.   ABDOMEN: No abdominal pain, constipation, diarrhea, nausea, vomiting or rectal bleeding.   URINARY: No frequency, dysuria, hematuria, or burning on urination.  REPRODUCTIVE: See HPI.   BREASTS: The patient performs breast self-examination and denies pain, lumps, or nipple discharge.   HEMATOLOGIC: No easy bruisability or excessive bleeding.    MUSCULOSKELETAL: Denies joint pain or swelling.   NEUROLOGIC: Denies syncope or weakness.   PSYCHIATRIC: Denies depression, anxiety or mood swings.    PHYSICAL EXAM:  APPEARANCE: Well nourished, well developed, in no acute distress.  AFFECT: WNL, alert and oriented x 3  SKIN: No acne or hirsutism  NECK: Neck symmetric without masses or thyromegaly  NODES: No inguinal, cervical, axillary, or femoral lymph node enlargement  CHEST: Good respiratory effect  ABDOMEN: Soft.  No tenderness or masses.  No hepatosplenomegaly.  No hernias.  BREASTS: Symmetrical, no skin changes or visible lesions.  No palpable masses, nipple discharge bilaterally.  PELVIC: Normal external genitalia without lesions.  Normal hair distribution.  Adequate perineal body, normal urethral meatus.  Vagina moist and well rugated without lesions or discharge.  Cervix pink, without lesions, discharge or tenderness.  No significant cystocele or rectocele.  Bimanual exam shows uterus to be normal size, regular, mobile and nontender.  Adnexa without masses or tenderness.    EXTREMITIES: No edema.    Cervical cancer screening  -     ThinPrep Pap Test; Future; Expected date: 01/10/2023  -     HPV DNA probe, amplified    Routine gynecological examination  -     ThinPrep Pap Test; Future; Expected date: 01/10/2023  -     HPV DNA probe, amplified    Menorrhagia with regular cycle  -     US Pelvis Complete Non OB; Future; Expected date: 01/17/2023  -     Follicle Stimulating Hormone; Future; Expected date: 01/10/2023  -     Luteinizing Hormone; Future; Expected date: 01/10/2023  -     TSH; Future; Expected date: 01/10/2023  -     CBC Auto Differential; Future; Expected date: 01/10/2023  -     Testosterone; Future; Expected date: 01/10/2023  -     T4, Free; Future; Expected date: 01/10/2023    Screen for STD (sexually transmitted disease)  -     Bacterial Vaginosis; Future; Expected date: 01/10/2023  -     Chlamydia/GC, PCR; Future; Expected date: 01/10/2023  -      Treponema Pallidum (Syphillis) Antibody; Future; Expected date: 01/10/2023  -     HIV 1/2 Ag/Ab (4th Gen); Future; Expected date: 01/10/2023  -     Hepatitis Panel, Acute; Future; Expected date: 01/10/2023    High risk sexual behavior, unspecified type  -     Bacterial Vaginosis; Future; Expected date: 01/10/2023  -     Chlamydia/GC, PCR; Future; Expected date: 01/10/2023  -     Treponema Pallidum (Syphillis) Antibody; Future; Expected date: 01/10/2023  -     HIV 1/2 Ag/Ab (4th Gen); Future; Expected date: 01/10/2023  -     Hepatitis Panel, Acute; Future; Expected date: 01/10/2023    Screening for HIV (human immunodeficiency virus)  -     HIV 1/2 Ag/Ab (4th Gen); Future; Expected date: 01/10/2023            Patient was counseled today on A.C.S. Pap guidelines and recommendations for yearly pelvic exams, mammograms and monthly self breast exams; to see her PCP for other health maintenance.   Exercise regimen encouraged  Healthy food choices encouraged  Questions answered to desired level of satisfaction  Verbalized understanding to all information and instructions    Follow up in about 3 weeks (around 1/31/2023) for Follow up and 1 year Annual.      Sadie Oneal M.D., FCOG    OB/GYN

## 2023-01-11 LAB
CHLAMYDIA BY PCR: NEGATIVE
GH SERPL-MCNC: NORMAL NG/ML
INSULIN SERPL-ACNC: NORMAL U[IU]/ML
LAB AP CLINICAL INFORMATION: NORMAL
LAB AP GYN INTERPRETATION: NEGATIVE
LAB AP PAP DISCLAIMER COMMENTS: NORMAL
N. GONORRHOEAE (GC) BY PCR: NEGATIVE
RENIN PLAS-CCNC: NORMAL NG/ML/H

## 2023-01-12 LAB
HPV 16: NEGATIVE
HPV 18: NEGATIVE
HPV OTHER: NEGATIVE

## 2023-01-17 ENCOUNTER — PROCEDURE VISIT (OUTPATIENT)
Dept: OBSTETRICS AND GYNECOLOGY | Facility: CLINIC | Age: 43
End: 2023-01-17
Attending: OBSTETRICS & GYNECOLOGY
Payer: OTHER GOVERNMENT

## 2023-01-17 DIAGNOSIS — N92.0 MENORRHAGIA WITH REGULAR CYCLE: ICD-10-CM

## 2023-01-17 PROCEDURE — 76856 US EXAM PELVIC COMPLETE: CPT | Mod: ,,, | Performed by: OBSTETRICS & GYNECOLOGY

## 2023-01-17 PROCEDURE — 99499 UNLISTED E&M SERVICE: CPT | Mod: ,,, | Performed by: OBSTETRICS & GYNECOLOGY

## 2023-01-17 PROCEDURE — 76856 PR  ECHO,PELVIC (NONOBSTETRIC): ICD-10-PCS | Mod: ,,, | Performed by: OBSTETRICS & GYNECOLOGY

## 2023-01-17 PROCEDURE — 99499 NO LOS: ICD-10-PCS | Mod: ,,, | Performed by: OBSTETRICS & GYNECOLOGY

## 2023-01-30 DIAGNOSIS — B96.89 BV (BACTERIAL VAGINOSIS): Primary | ICD-10-CM

## 2023-01-30 DIAGNOSIS — N76.0 BV (BACTERIAL VAGINOSIS): Primary | ICD-10-CM

## 2023-01-30 RX ORDER — METRONIDAZOLE 500 MG/1
500 TABLET ORAL 2 TIMES DAILY
Qty: 14 TABLET | Refills: 0 | Status: SHIPPED | OUTPATIENT
Start: 2023-01-30 | End: 2023-02-06

## 2023-01-31 ENCOUNTER — OFFICE VISIT (OUTPATIENT)
Dept: OBSTETRICS AND GYNECOLOGY | Facility: CLINIC | Age: 43
End: 2023-01-31
Payer: OTHER GOVERNMENT

## 2023-01-31 VITALS
DIASTOLIC BLOOD PRESSURE: 56 MMHG | WEIGHT: 118 LBS | BODY MASS INDEX: 20.9 KG/M2 | HEART RATE: 69 BPM | SYSTOLIC BLOOD PRESSURE: 103 MMHG

## 2023-01-31 DIAGNOSIS — N92.0 MENORRHAGIA WITH REGULAR CYCLE: Primary | ICD-10-CM

## 2023-01-31 PROCEDURE — 99213 OFFICE O/P EST LOW 20 MIN: CPT | Mod: ,,, | Performed by: OBSTETRICS & GYNECOLOGY

## 2023-01-31 PROCEDURE — 99213 PR OFFICE/OUTPT VISIT, EST, LEVL III, 20-29 MIN: ICD-10-PCS | Mod: ,,, | Performed by: OBSTETRICS & GYNECOLOGY

## 2023-01-31 NOTE — PROGRESS NOTES
Subjective:       Patient ID: Shilpa Dixon is a 42 y.o. female.    Chief Complaint:  Follow-up (3 wk f/u from 1/10/23)      History of Present Illness  HPI  Pt here to follow up from labs and US from 01/10/2023    GYN & OB History  Patient's last menstrual period was 2023 (approximate).   Date of Last Pap: No result found    OB History    Para Term  AB Living   2         2   SAB IAB Ectopic Multiple Live Births                  # Outcome Date GA Lbr Cachorro/2nd Weight Sex Delivery Anes PTL Lv   2             1                 Review of Systems  Review of Systems   Constitutional:  Negative for activity change, appetite change, fatigue and unexpected weight change.   HENT: Negative.     Respiratory:  Negative for cough, shortness of breath and wheezing.    Cardiovascular:  Negative for chest pain, palpitations and leg swelling.   Gastrointestinal:  Negative for abdominal pain, bloating, blood in stool, constipation, diarrhea, nausea, vomiting and reflux.   Genitourinary:  Positive for menorrhagia (menorrhagia). Negative for bladder incontinence, decreased libido, dysmenorrhea, dyspareunia, dysuria, flank pain, frequency, menstrual problem, pelvic pain, urgency, vaginal bleeding, vaginal discharge, postcoital bleeding and vaginal odor.   Musculoskeletal:  Negative for back pain.   Integumentary:  Negative for rash, acne, hair changes, mole/lesion, breast mass, nipple discharge, breast skin changes and breast tenderness.   Neurological:  Negative for headaches.   Psychiatric/Behavioral:  Negative for depression and sleep disturbance. The patient is not nervous/anxious.    Breast: Negative for asymmetry, breast self exam, lump, mass, nipple discharge, skin changes and tenderness        Objective:    Physical Exam:   Constitutional: She is oriented to person, place, and time. She appears well-developed and well-nourished.    HENT:   Head: Normocephalic and atraumatic.    Eyes: Pupils are  equal, round, and reactive to light. EOM are normal.     Cardiovascular:  Normal rate, regular rhythm and normal heart sounds.             Pulmonary/Chest: Effort normal and breath sounds normal.        Abdominal: Soft. Bowel sounds are normal.             Musculoskeletal: Normal range of motion and moves all extremeties.       Neurological: She is alert and oriented to person, place, and time. She has normal reflexes.     Psychiatric: She has a normal mood and affect. Her behavior is normal. Judgment and thought content normal.        Assessment:        1. Menorrhagia with regular cycle       Office Visit on 01/10/2023   Component Date Value Ref Range Status    Candida Species 01/10/2023 Negative  Negative, Invalid Final    Gardnerella 01/10/2023 Positive (A)  Negative, Invalid Final    Trichomonas 01/10/2023 Negative  Negative, Invalid Final    Chlamydia by PCR 01/10/2023 Negative  Negative, Invalid Final    N. gonorrhoeae (GC) by PCR 01/10/2023 Negative  Negative, Invalid Final    Free T4 01/10/2023 0.99  0.76 - 1.46 ng/dL Final    Case Report 01/10/2023    Final                    Value:Pap Cytology                                      Case: [id number]                                   Authorizing Provider:  Sadie Oneal MD    Collected:           01/10/2023 02:15 PM          Ordering Location:     [facility]   Received:            01/11/2023 08:56 AM                                 Clinic - OB/GYN                                                              First Screen:          VALENTE Chan(ASCP)                                                        Specimen:    Liquid-Based Pap Test, Screening, Cervix                                                   Interpretation 01/10/2023 Negative              Final    General Categorization 01/10/2023 Negative for intraepithelial lesion or malignancy   Final    Specimen Adequacy 01/10/2023 Satisfactory for evaluation   Final    Clinical  Information 01/10/2023    Final                    Value:This result contains rich text formatting which cannot be displayed here.    Disclaimer 01/10/2023    Final                    Value:This result contains rich text formatting which cannot be displayed here.    Syphilis Ab Interpretation 01/10/2023 Non-Reactive  Non-Reactive Final    0.0 - 0.9: Non-Reactive  0.91 - 1.10: Equivocal with RPR to follow  >1.10:  Reactive with RPR to Follow    HIV 1/2 01/10/2023 Non-Reactive  Non-Reactive Final    Hepatits A Ab, IgM 01/10/2023 Non-Reactive  Non-Reactive Final    Hepatitis B Surface Ag 01/10/2023 Non-Reactive  Non-Reactive Final    Hepatitis B Core Ab, IgM 01/10/2023 Non-Reactive  Non-Reactive Final    Hepatitis C Ab 01/10/2023 Non-Reactive  Non-Reactive Final    FSH 01/10/2023 3.9  1.7 - 134.8 mIU/mL Final      Men:     0.7 - 10.8 mIU/mL    Women:  Follicular Phase:      2.3 - 12.6 mIU/mL  Mid-Cycle Peak:      5.2 - 17.5 mIU/mL  Luteal Phase:          1.7 - 12.9 mIU/mL    Postmenopausal:  On Hormone Therapy:     5.9 - 72.8 mIU/mL  Untreated:                        12.7 - 132.2 mIU/mL    LH, Blood 01/10/2023 4.6  mIu/mL Final      Men:       1.2 - 10.6 mIU/mL    Women:  Follicular Phase:        1.9 - 12.8 mIU/mL  Mid-Cycle Peak:        22.8 - 76.1 mIU/mL  Luteal Phase:             0.6 - 13.5 mIU/mL    Post-Menopausal:  On Hormone Therapy:           1.1 - 52.4 mIU/mL  Untreated:                              0.6 - 61.8 mIU/mL        TSH 01/10/2023 2.640  0.358 - 3.740 uIU/mL Final    Testosterone 01/10/2023 21.0  8.0 - 60.0 ng/dL Final    WBC 01/10/2023 7.23  4.50 - 11.00 K/uL Final    RBC 01/10/2023 4.66  4.20 - 5.40 M/uL Final    Hemoglobin 01/10/2023 14.9  12.0 - 16.0 g/dL Final    Hematocrit 01/10/2023 43.8  38.0 - 47.0 % Final    MCV 01/10/2023 94.0  80.0 - 96.0 fL Final    MCH 01/10/2023 32.0 (H)  27.0 - 31.0 pg Final    MCHC 01/10/2023 34.0  32.0 - 36.0 g/dL Final    RDW 01/10/2023 12.3  11.5 - 14.5 % Final     Platelet Count 01/10/2023 235  150 - 400 K/uL Final    MPV 01/10/2023 12.1  9.4 - 12.4 fL Final    Neutrophils % 01/10/2023 71.6 (H)  53.0 - 65.0 % Final    Lymphocytes % 01/10/2023 21.2 (L)  27.0 - 41.0 % Final    Monocytes % 01/10/2023 6.1 (H)  2.0 - 6.0 % Final    Eosinophils % 01/10/2023 0.4 (L)  1.0 - 4.0 % Final    Basophils % 01/10/2023 0.4  0.0 - 1.0 % Final    Immature Granulocytes % 01/10/2023 0.3  0.0 - 0.4 % Final    nRBC, Auto 01/10/2023 0.0  <=0.0 % Final    Neutrophils, Abs 01/10/2023 5.18  1.80 - 7.70 K/uL Final    Lymphocytes, Absolute 01/10/2023 1.53  1.00 - 4.80 K/uL Final    Monocytes, Absolute 01/10/2023 0.44  0.00 - 0.80 K/uL Final    Eosinophils, Absolute 01/10/2023 0.03  0.00 - 0.50 K/uL Final    Basophils, Absolute 01/10/2023 0.03  0.00 - 0.20 K/uL Final    Immature Granulocytes, Absolute 01/10/2023 0.02  0.00 - 0.04 K/uL Final    nRBC, Absolute 01/10/2023 0.00  <=0.00 x10e3/uL Final    Diff Type 01/10/2023 Auto   Final    HPV 16 01/10/2023 Negative  Negative (NEG) Final    HPV 18 01/10/2023 Negative  Negative, Invalid Final    HPV Other 01/10/2023 Negative  Negative, Invalid Final     Pt states she is bleeding heavy on her cycles with clots.     Reviewed treatment options with pt. Pt states she will like to go forward with EMB/Endosee        Plan:      Reviewed treatment options with pt. Pt states she will like to go forward with EMB/Endosee

## 2023-04-04 ENCOUNTER — PROCEDURE VISIT (OUTPATIENT)
Dept: OBSTETRICS AND GYNECOLOGY | Facility: CLINIC | Age: 43
End: 2023-04-04
Payer: OTHER GOVERNMENT

## 2023-04-04 VITALS
BODY MASS INDEX: 20.27 KG/M2 | SYSTOLIC BLOOD PRESSURE: 102 MMHG | WEIGHT: 114.38 LBS | HEART RATE: 71 BPM | DIASTOLIC BLOOD PRESSURE: 64 MMHG

## 2023-04-04 DIAGNOSIS — N92.0 MENORRHAGIA WITH REGULAR CYCLE: Primary | ICD-10-CM

## 2023-04-04 LAB
B-HCG UR QL: NEGATIVE
CTP QC/QA: YES

## 2023-04-04 PROCEDURE — 58558 HYSTEROSCOPY BIOPSY: CPT | Mod: ,,, | Performed by: OBSTETRICS & GYNECOLOGY

## 2023-04-04 PROCEDURE — 99499 UNLISTED E&M SERVICE: CPT | Mod: ,,, | Performed by: OBSTETRICS & GYNECOLOGY

## 2023-04-04 PROCEDURE — 88305 TISSUE EXAM BY PATHOLOGIST: CPT | Mod: TC,SUR | Performed by: OBSTETRICS & GYNECOLOGY

## 2023-04-04 PROCEDURE — 88305 TISSUE EXAM BY PATHOLOGIST: CPT | Mod: 26,,, | Performed by: PATHOLOGY

## 2023-04-04 PROCEDURE — 58558 PR HYSTEROSCOPY,W/ENDO BX: ICD-10-PCS | Mod: ,,, | Performed by: OBSTETRICS & GYNECOLOGY

## 2023-04-04 PROCEDURE — 99499 NO LOS: ICD-10-PCS | Mod: ,,, | Performed by: OBSTETRICS & GYNECOLOGY

## 2023-04-04 PROCEDURE — 81025 POCT URINE PREGNANCY: ICD-10-PCS | Mod: QW,,, | Performed by: OBSTETRICS & GYNECOLOGY

## 2023-04-04 PROCEDURE — 81025 URINE PREGNANCY TEST: CPT | Mod: QW,,, | Performed by: OBSTETRICS & GYNECOLOGY

## 2023-04-04 PROCEDURE — 88305 SURGICAL PATHOLOGY: ICD-10-PCS | Mod: 26,,, | Performed by: PATHOLOGY

## 2023-04-04 NOTE — PROCEDURES
Procedures  Hysteorscopy, EMB Procedure Note:    Following informed written consent, the pt was placed in the lithotomy position. The speculum was inserted into the vagina and the cervix visualized with ease. The cervix was cleaned with betadine.  The uterus was sounded to 8 cm with the uterine sound. The hysteroscope was then inserted into the cervical os and the uterine cavity was directly visualized, bilateral ostea were noted at that time. Findings no lesions, polyps or fibroids. The hysteroscope was then removed and a biopsy of the endometrium performed with a pipelle. Specimen sent to pathology for evaluation. The single tooth tenaculum was then removed and the cervix was made hemostatic with pressure.

## 2023-04-05 LAB
DHEA SERPL-MCNC: NORMAL
ESTROGEN SERPL-MCNC: NORMAL PG/ML
INSULIN SERPL-ACNC: NORMAL U[IU]/ML
LAB AP CLINICAL INFORMATION: NORMAL
LAB AP GROSS DESCRIPTION: NORMAL
LAB AP LABORATORY NOTES: NORMAL
T3RU NFR SERPL: NORMAL %

## 2023-04-18 ENCOUNTER — OFFICE VISIT (OUTPATIENT)
Dept: OBSTETRICS AND GYNECOLOGY | Facility: CLINIC | Age: 43
End: 2023-04-18
Payer: OTHER GOVERNMENT

## 2023-04-18 VITALS
BODY MASS INDEX: 20.62 KG/M2 | WEIGHT: 116.38 LBS | SYSTOLIC BLOOD PRESSURE: 92 MMHG | DIASTOLIC BLOOD PRESSURE: 56 MMHG | HEART RATE: 70 BPM

## 2023-04-18 DIAGNOSIS — N92.0 MENORRHAGIA WITH REGULAR CYCLE: Primary | ICD-10-CM

## 2023-04-18 PROCEDURE — 99214 PR OFFICE/OUTPT VISIT, EST, LEVL IV, 30-39 MIN: ICD-10-PCS | Mod: ,,, | Performed by: OBSTETRICS & GYNECOLOGY

## 2023-04-18 PROCEDURE — 99214 OFFICE O/P EST MOD 30 MIN: CPT | Mod: ,,, | Performed by: OBSTETRICS & GYNECOLOGY

## 2023-04-18 NOTE — PROGRESS NOTES
Subjective:      Patient ID: Shilpa Dixon is a 42 y.o. female.    Chief Complaint:  Follow-up (2 wk f/u from  23.)      History of Present Illness  HPI  Pt here to follow up from [facility] on 2023.    GYN & OB History  Patient's last menstrual period was 2023 (approximate).   Date of Last Pap: No result found    OB History    Para Term  AB Living   2         2   SAB IAB Ectopic Multiple Live Births                  # Outcome Date GA Lbr Cachorro/2nd Weight Sex Delivery Anes PTL Lv   2             1                 Review of Systems  Review of Systems   Constitutional:  Negative for activity change, appetite change, fatigue and unexpected weight change.   HENT: Negative.     Respiratory:  Negative for cough, shortness of breath and wheezing.    Cardiovascular:  Negative for chest pain, palpitations and leg swelling.   Gastrointestinal:  Negative for abdominal pain, bloating, blood in stool, constipation, diarrhea, nausea, vomiting and reflux.   Genitourinary:  Positive for menorrhagia (menorrhagia). Negative for bladder incontinence, decreased libido, dysmenorrhea, dyspareunia, dysuria, flank pain, frequency, menstrual problem, pelvic pain, urgency, vaginal bleeding, vaginal discharge, postcoital bleeding and vaginal odor.   Musculoskeletal:  Negative for back pain.   Integumentary:  Negative for rash, acne, hair changes, mole/lesion, breast mass, nipple discharge, breast skin changes and breast tenderness.   Neurological:  Negative for headaches.   Psychiatric/Behavioral:  Negative for depression and sleep disturbance. The patient is not nervous/anxious.    Breast: Negative for asymmetry, breast self exam, lump, mass, nipple discharge, skin changes and tenderness       Objective:     Physical Exam:   Constitutional: She is oriented to person, place, and time. She appears well-developed and well-nourished.    HENT:   Head: Normocephalic and atraumatic.    Eyes: Pupils are equal,  round, and reactive to light. EOM are normal.     Cardiovascular:  Normal rate, regular rhythm and normal heart sounds.             Pulmonary/Chest: Effort normal and breath sounds normal.        Abdominal: Soft. Bowel sounds are normal.             Musculoskeletal: Normal range of motion and moves all extremeties.       Neurological: She is alert and oriented to person, place, and time. She has normal reflexes.     Psychiatric: She has a normal mood and affect. Her behavior is normal. Judgment and thought content normal.       Assessment:     1. Menorrhagia with regular cycle       Reviewed pathology results with pt:  A. Endometrium, biopsy:  - Benign appearing inactive endometrium  - Polypoid fragments of lower uterine segment tissue with tubal metaplasia and non atypical crowded glands likely representing artifact  - Benign endocervix/endocervical epithelium, mucus, and blood  - See comments     Reviewed different birth control and surgical options with pt. Pt states her  had a vasectomy. Pt states she would like to wait and review her options at this time.        Plan:     See above

## 2023-05-06 NOTE — PROCEDURES
Procedures  GYN Ultrasound Note:    Uterus 6.82 x 3.66 x 4.57 cm  Endometrial thickness 0.65 cm    Right ovary 2.37 x 1.79 x 1.74 cm  Left ovary 2.17 x 1.55 x 1.60 cm    Impression:  No free fluid seen adnexa

## 2023-05-08 ENCOUNTER — OFFICE VISIT (OUTPATIENT)
Dept: GASTROENTEROLOGY | Facility: CLINIC | Age: 43
End: 2023-05-08
Payer: OTHER GOVERNMENT

## 2023-05-08 VITALS
DIASTOLIC BLOOD PRESSURE: 51 MMHG | HEART RATE: 63 BPM | OXYGEN SATURATION: 100 % | WEIGHT: 114.38 LBS | SYSTOLIC BLOOD PRESSURE: 94 MMHG | HEIGHT: 63 IN | BODY MASS INDEX: 20.27 KG/M2

## 2023-05-08 DIAGNOSIS — K31.84 GASTROPARESIS: Primary | ICD-10-CM

## 2023-05-08 PROCEDURE — 99214 OFFICE O/P EST MOD 30 MIN: CPT | Mod: PBBFAC | Performed by: NURSE PRACTITIONER

## 2023-05-08 PROCEDURE — 99214 PR OFFICE/OUTPT VISIT, EST, LEVL IV, 30-39 MIN: ICD-10-PCS | Mod: S$PBB,,, | Performed by: NURSE PRACTITIONER

## 2023-05-08 PROCEDURE — 99214 OFFICE O/P EST MOD 30 MIN: CPT | Mod: S$PBB,,, | Performed by: NURSE PRACTITIONER

## 2023-05-08 NOTE — PROGRESS NOTES
Shilpa Dixon is a 42 y.o. female here for Follow-up        PCP: Larry Pollock  Referring Provider: No referring provider defined for this encounter.     HPI:  Presents for follow-up due to gastroparesis.Delayed gastric emptying with calculated half time of 532 minutes.  EGD on 07/29/2022, pyloric channel patent, no H pylori.  Patient continues to report nausea, early satiety, and also epigastric pain.  Patient is concerned about nutritional intake. We did discuss dietitian. Patient reports that she will be moving in a few weeks to [place]. She will require outpatient GI follow-up at that time.  We did discuss Reglan, but due to moving we will not start Reglan at this time.  We will give her a written gastroparesis diet today.  Reports that she is taking MiraLax intermittently for constipation.  Additionally she is reporting increased fatigue.    Follow-up  Associated symptoms include abdominal pain, fatigue and nausea. Pertinent negatives include no change in bowel habit, chest pain, fever or vomiting.       ROS:  Review of Systems   Constitutional:  Positive for appetite change and fatigue. Negative for fever and unexpected weight change.   HENT:  Negative for trouble swallowing.    Respiratory:  Negative for shortness of breath.    Cardiovascular:  Negative for chest pain.   Gastrointestinal:  Positive for abdominal pain, constipation and nausea. Negative for anal bleeding, blood in stool, change in bowel habit, diarrhea, vomiting, reflux, fecal incontinence and change in bowel habit.   Musculoskeletal:  Negative for gait problem.   Integumentary:  Negative for pallor.   Neurological:  Negative for light-headedness.   Hematological:  Does not bruise/bleed easily.   Psychiatric/Behavioral:  The patient is not nervous/anxious.         PMHX:  has a past medical history of Acute superficial gastritis without hemorrhage (7/29/2022) and Gastroparesis (7/29/2022).    PSHX:  has no past surgical history on file.    PFHX:  "family history includes Cancer in her maternal aunt, maternal grandmother, and mother; Diabetes in her maternal uncle; Hyperlipidemia in her father.    PSlHX:  reports that she has never smoked. She has never been exposed to tobacco smoke. She has never used smokeless tobacco. She reports current alcohol use. She reports that she does not use drugs.        Review of patient's allergies indicates:  No Known Allergies    Medication List with Changes/Refills   Current Medications    FERROUS SULFATE 324 MG (65 MG IRON) TBEC    Take 1 tablet (324 mg total) by mouth once daily.    POLYETHYLENE GLYCOL (GLYCOLAX) 17 GRAM PWPK    Take 17 g by mouth once daily.        Objective Findings:  Vital Signs:  BP (!) 94/51   Pulse 63   Ht 5' 3" (1.6 m)   Wt 51.9 kg (114 lb 6.4 oz)   SpO2 100%   BMI 20.27 kg/m²  Body mass index is 20.27 kg/m².    Physical Exam:  Physical Exam  Vitals and nursing note reviewed.   Constitutional:       General: She is not in acute distress.     Appearance: Normal appearance.   HENT:      Mouth/Throat:      Mouth: Mucous membranes are moist.   Cardiovascular:      Rate and Rhythm: Normal rate.   Pulmonary:      Effort: Pulmonary effort is normal.      Breath sounds: No wheezing, rhonchi or rales.   Abdominal:      General: Bowel sounds are normal. There is no distension.      Palpations: Abdomen is soft. There is no mass.      Tenderness: There is no abdominal tenderness. There is no guarding.   Skin:     General: Skin is warm and dry.      Coloration: Skin is not jaundiced or pale.   Neurological:      Mental Status: She is alert and oriented to person, place, and time.   Psychiatric:         Mood and Affect: Mood normal.        Labs:  Lab Results   Component Value Date    WBC 7.23 01/10/2023    HGB 14.9 01/10/2023    HCT 43.8 01/10/2023    MCV 94.0 01/10/2023    RDW 12.3 01/10/2023     01/10/2023    LYMPH 21.2 (L) 01/10/2023    LYMPH 1.53 01/10/2023    MONO 6.1 (H) 01/10/2023    EOS 0.03 " 01/10/2023    BASO 0.03 01/10/2023     Lab Results   Component Value Date     (L) 11/07/2022    K 4.5 11/07/2022     11/07/2022    CO2 29 11/07/2022    GLU 89 11/07/2022    BUN 13 11/07/2022    CREATININE 0.88 11/07/2022    CALCIUM 9.4 11/07/2022    PROT 8.0 11/07/2022    ALBUMIN 4.1 11/07/2022    BILITOT 0.3 11/07/2022    ALKPHOS 61 11/07/2022    AST 16 11/07/2022    ALT 28 11/07/2022         Imaging: No results found.      Assessment:  Shilpa Dixon is a 42 y.o. female here with:  1. Gastroparesis          Recommendations:  1. Do not lay down within 3 hours of eating.  Avoid spicy, greasy foods  Eat 6-8 small meals per day.  Exercise 150 minutes per week  Avoid raw fruits and vegetables  Small amount of protein and fiber at one time  2. Obtain GI care in [place]. Patient is moving in a few weeks and will need GI care    No follow-ups on file.      Order summary:       Thank you for allowing me to participate in the care of Shilpa Dixon.      VICTOR MANUEL Castro